# Patient Record
Sex: FEMALE | Race: AMERICAN INDIAN OR ALASKA NATIVE | URBAN - NONMETROPOLITAN AREA
[De-identification: names, ages, dates, MRNs, and addresses within clinical notes are randomized per-mention and may not be internally consistent; named-entity substitution may affect disease eponyms.]

---

## 2017-02-01 ENCOUNTER — HISTORY (OUTPATIENT)
Dept: EMERGENCY MEDICINE | Facility: OTHER | Age: 32
End: 2017-02-01

## 2017-02-06 ENCOUNTER — HISTORY (OUTPATIENT)
Dept: EMERGENCY MEDICINE | Facility: OTHER | Age: 32
End: 2017-02-06

## 2017-02-12 ENCOUNTER — HISTORY (OUTPATIENT)
Dept: EMERGENCY MEDICINE | Facility: OTHER | Age: 32
End: 2017-02-12

## 2017-04-06 ENCOUNTER — OFFICE VISIT - GICH (OUTPATIENT)
Dept: FAMILY MEDICINE | Facility: OTHER | Age: 32
End: 2017-04-06

## 2017-04-06 ENCOUNTER — HISTORY (OUTPATIENT)
Dept: FAMILY MEDICINE | Facility: OTHER | Age: 32
End: 2017-04-06

## 2017-04-06 DIAGNOSIS — R45.89 OTHER SYMPTOMS AND SIGNS INVOLVING EMOTIONAL STATE: ICD-10-CM

## 2017-12-17 ENCOUNTER — HEALTH MAINTENANCE LETTER (OUTPATIENT)
Age: 32
End: 2017-12-17

## 2018-01-04 NOTE — NURSING NOTE
Patient Information     Patient Name MRN Sex Ezra Blum [id number] Female 1985      Nursing Note by Lavinia Schmidt at 2017 10:30 AM     Author:  Lavinia Schmidt Service:  (none) Author Type:  NURS- Student Practical Nurse     Filed:  2017 10:50 AM Encounter Date:  2017 Status:  Signed     :  Lavinia Schmidt (NURS- Student Practical Nurse)            Patient presents with bilateral feet and knee pain, silver in palm of right hand. Patient is concerned she has glass in her feet from when stepping on it when she was young. Patient is concerned of the curvature of her toes. Lavinia Schmidt LPN .............2017  10:45 AM
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
## 2018-01-27 VITALS
HEART RATE: 77 BPM | BODY MASS INDEX: 30.13 KG/M2 | DIASTOLIC BLOOD PRESSURE: 70 MMHG | WEIGHT: 198.8 LBS | HEIGHT: 68 IN | SYSTOLIC BLOOD PRESSURE: 120 MMHG | TEMPERATURE: 98.2 F

## 2018-03-01 ENCOUNTER — DOCUMENTATION ONLY (OUTPATIENT)
Dept: FAMILY MEDICINE | Facility: OTHER | Age: 33
End: 2018-03-01

## 2018-03-01 RX ORDER — LAMOTRIGINE 150 MG/1
1 TABLET ORAL
COMMUNITY

## 2018-03-01 RX ORDER — CITALOPRAM HYDROBROMIDE 40 MG/1
40 TABLET ORAL DAILY PRN
COMMUNITY
Start: 2016-11-04